# Patient Record
Sex: MALE | Race: WHITE
[De-identification: names, ages, dates, MRNs, and addresses within clinical notes are randomized per-mention and may not be internally consistent; named-entity substitution may affect disease eponyms.]

---

## 2019-07-19 ENCOUNTER — HOSPITAL ENCOUNTER (OUTPATIENT)
Dept: HOSPITAL 92 - BICMRI | Age: 34
Discharge: HOME | End: 2019-07-19
Attending: PHYSICIAN ASSISTANT
Payer: OTHER GOVERNMENT

## 2019-07-19 DIAGNOSIS — M25.462: Primary | ICD-10-CM

## 2019-07-19 DIAGNOSIS — M23.92: ICD-10-CM

## 2019-07-19 DIAGNOSIS — M25.562: ICD-10-CM

## 2019-07-19 NOTE — MRI
EXAM:

MRI left knee



PROVIDED CLINICAL HISTORY:

Pain status post injury



COMPARISON:

None



FINDINGS:

The anterior cruciate ligament, posterior cruciate ligament, medial collateral ligament and lateral c
ollateral ligamentous complex demonstrate an intact MR appearance, as does the extensor mechanism.



The medial and lateral menisci demonstrate no evidence for tear.



There is marrow edema involving the lateral aspect of the central weightbearing portions of the later
al femoral condyle with a 3 mm full-thickness articular cartilage defect involving the central

weightbearing portion of the lateral femoral condyle laterally. Medial femorotibial cartilage appears
 preserved.



There is marrow edema involving the inferior-medial patella with irregularity of the overlying articu
lar cartilage including areas of full-thickness fissuring. The medial patellar retinaculum appears

intact. There is edema at the expected attachment of the medial patellofemoral ligament with intact m
edial patellofemoral ligament fibers not identified. Patella crista is noted.



There is a moderate-large knee joint effusion. Regional marrow signal appears otherwise normal. Extra
vasation of joint fluid in the popliteal recess produces signal alteration within the adjacent

musculature.



IMPRESSION:



1. Findings compatible with recent lateral patellar dislocation/relocation, with partial-thickness ar
ticular cartilage injury involving the inferior aspects of the medial patellar facet. Intact fibers

of the MPFL are not identified.

2. Small full-thickness articular cartilage defect involving lateral femoral condyle.

3. Moderate-large knee joint effusion.



Reported By: Jose Martinez 

Electronically Signed:  7/19/2019 9:18 AM

## 2020-09-20 ENCOUNTER — HOSPITAL ENCOUNTER (EMERGENCY)
Dept: HOSPITAL 92 - ERS | Age: 35
Discharge: HOME | End: 2020-09-20
Payer: OTHER GOVERNMENT

## 2020-09-20 DIAGNOSIS — R06.00: Primary | ICD-10-CM

## 2020-09-20 LAB
ALBUMIN SERPL BCG-MCNC: 4.4 G/DL (ref 3.5–5)
ALP SERPL-CCNC: 69 U/L (ref 40–110)
ALT SERPL W P-5'-P-CCNC: 32 U/L (ref 8–55)
ANION GAP SERPL CALC-SCNC: 18 MMOL/L (ref 10–20)
AST SERPL-CCNC: 31 U/L (ref 5–34)
BASOPHILS # BLD AUTO: 0.1 THOU/UL (ref 0–0.2)
BASOPHILS NFR BLD AUTO: 1.2 % (ref 0–1)
BILIRUB SERPL-MCNC: 0.3 MG/DL (ref 0.2–1.2)
BUN SERPL-MCNC: 22 MG/DL (ref 8.9–20.6)
CALCIUM SERPL-MCNC: 9.1 MG/DL (ref 7.8–10.44)
CHLORIDE SERPL-SCNC: 102 MMOL/L (ref 98–107)
CO2 SERPL-SCNC: 22 MMOL/L (ref 22–29)
CREAT CL PREDICTED SERPL C-G-VRATE: 0 ML/MIN (ref 70–130)
EOSINOPHIL # BLD AUTO: 0.1 THOU/UL (ref 0–0.7)
EOSINOPHIL NFR BLD AUTO: 1.8 % (ref 0–10)
GLOBULIN SER CALC-MCNC: 3.6 G/DL (ref 2.4–3.5)
GLUCOSE SERPL-MCNC: 116 MG/DL (ref 70–105)
HGB BLD-MCNC: 15.6 G/DL (ref 14–18)
LIPASE SERPL-CCNC: 50 U/L (ref 8–78)
LYMPHOCYTES # BLD: 3.3 THOU/UL (ref 1.2–3.4)
LYMPHOCYTES NFR BLD AUTO: 46.1 % (ref 21–51)
MCH RBC QN AUTO: 32.6 PG (ref 27–31)
MCV RBC AUTO: 93.5 FL (ref 78–98)
MONOCYTES # BLD AUTO: 0.5 THOU/UL (ref 0.11–0.59)
MONOCYTES NFR BLD AUTO: 6.7 % (ref 0–10)
NEUTROPHILS # BLD AUTO: 3.2 THOU/UL (ref 1.4–6.5)
NEUTROPHILS NFR BLD AUTO: 44.3 % (ref 42–75)
PLATELET # BLD AUTO: 215 THOU/UL (ref 130–400)
POTASSIUM SERPL-SCNC: 3.7 MMOL/L (ref 3.5–5.1)
PROT UR STRIP.AUTO-MCNC: 20 MG/DL
RBC # BLD AUTO: 4.78 MILL/UL (ref 4.7–6.1)
SODIUM SERPL-SCNC: 138 MMOL/L (ref 136–145)
SP GR UR STRIP: 1.03 (ref 1–1.04)
WBC # BLD AUTO: 7.1 THOU/UL (ref 4.8–10.8)

## 2020-09-20 PROCEDURE — 36415 COLL VENOUS BLD VENIPUNCTURE: CPT

## 2020-09-20 PROCEDURE — 85025 COMPLETE CBC W/AUTO DIFF WBC: CPT

## 2020-09-20 PROCEDURE — 93005 ELECTROCARDIOGRAM TRACING: CPT

## 2020-09-20 PROCEDURE — 70360 X-RAY EXAM OF NECK: CPT

## 2020-09-20 PROCEDURE — 80053 COMPREHEN METABOLIC PANEL: CPT

## 2020-09-20 PROCEDURE — 71045 X-RAY EXAM CHEST 1 VIEW: CPT

## 2020-09-20 PROCEDURE — 81003 URINALYSIS AUTO W/O SCOPE: CPT

## 2020-09-20 PROCEDURE — 83690 ASSAY OF LIPASE: CPT

## 2020-09-20 NOTE — RAD
EXAM: 

CHEST ONE VIEW



HISTORY:

Shortness of breath and chest pain as well as epigastric abdominal pain.



COMPARISON:

None



FINDINGS:

The cardiac silhouette and pulmonary vasculature are within normal limits. The lungs are clear. The o
sseous structures are intact.



IMPRESSION:

No acute cardiopulmonary process.



Reported By: Canelo Nayak 

Electronically Signed:  9/20/2020 8:42 AM

## 2020-09-20 NOTE — RAD
EXAM:

XR Neck Soft Tissue



PROVIDED CLINICAL HISTORY:

Shortness of breath beginning 20 minutes prior to this examination. Pain in the epigastric region.



COMPARISON:

None



FINDINGS:

Prevertebral soft tissues are within normal limits. The epiglottis and aryepiglottic folds have a nor
mal radiographic appearance. Mild degenerative changes are seen in the cervical spine at the C5-6

level. Osseous structures otherwise have a normal appearance.



IMPRESSION:

No acute findings.



Reported By: Canelo Nayak 

Electronically Signed:  9/20/2020 8:44 AM

## 2020-10-16 ENCOUNTER — HOSPITAL ENCOUNTER (EMERGENCY)
Dept: HOSPITAL 92 - ERS | Age: 35
Discharge: HOME | End: 2020-10-16
Payer: OTHER GOVERNMENT

## 2020-10-16 DIAGNOSIS — K21.9: Primary | ICD-10-CM

## 2020-10-16 LAB
ALBUMIN SERPL BCG-MCNC: 4.6 G/DL (ref 3.5–5)
ALP SERPL-CCNC: 49 U/L (ref 40–110)
ALT SERPL W P-5'-P-CCNC: 16 U/L (ref 8–55)
ANION GAP SERPL CALC-SCNC: 13 MMOL/L (ref 10–20)
AST SERPL-CCNC: 19 U/L (ref 5–34)
BASOPHILS # BLD AUTO: 0 THOU/UL (ref 0–0.2)
BASOPHILS NFR BLD AUTO: 0.3 % (ref 0–1)
BILIRUB SERPL-MCNC: 0.5 MG/DL (ref 0.2–1.2)
BUN SERPL-MCNC: 18 MG/DL (ref 8.9–20.6)
CALCIUM SERPL-MCNC: 9.6 MG/DL (ref 7.8–10.44)
CHLORIDE SERPL-SCNC: 105 MMOL/L (ref 98–107)
CO2 SERPL-SCNC: 26 MMOL/L (ref 22–29)
CREAT CL PREDICTED SERPL C-G-VRATE: 0 ML/MIN (ref 70–130)
EOSINOPHIL # BLD AUTO: 0 THOU/UL (ref 0–0.7)
EOSINOPHIL NFR BLD AUTO: 0.1 % (ref 0–10)
GLOBULIN SER CALC-MCNC: 2.9 G/DL (ref 2.4–3.5)
GLUCOSE SERPL-MCNC: 143 MG/DL (ref 70–105)
HGB BLD-MCNC: 15.8 G/DL (ref 14–18)
LYMPHOCYTES # BLD: 0.5 THOU/UL (ref 1.2–3.4)
LYMPHOCYTES NFR BLD AUTO: 4.6 % (ref 21–51)
MCH RBC QN AUTO: 33.7 PG (ref 27–31)
MCV RBC AUTO: 93.4 FL (ref 78–98)
MONOCYTES # BLD AUTO: 0.4 THOU/UL (ref 0.11–0.59)
MONOCYTES NFR BLD AUTO: 4.5 % (ref 0–10)
MUCOUS THREADS UR QL AUTO: (no result) LPF
NEUTROPHILS # BLD AUTO: 8.9 THOU/UL (ref 1.4–6.5)
NEUTROPHILS NFR BLD AUTO: 90.5 % (ref 42–75)
PLATELET # BLD AUTO: 223 THOU/UL (ref 130–400)
POTASSIUM SERPL-SCNC: 3.6 MMOL/L (ref 3.5–5.1)
PROT UR STRIP.AUTO-MCNC: 50 MG/DL
RBC # BLD AUTO: 4.68 MILL/UL (ref 4.7–6.1)
SODIUM SERPL-SCNC: 140 MMOL/L (ref 136–145)
SP GR UR STRIP: 1.02 (ref 1–1.04)
WBC # BLD AUTO: 9.9 THOU/UL (ref 4.8–10.8)
WBC UR QL AUTO: (no result) HPF (ref 0–3)

## 2020-10-16 PROCEDURE — 83690 ASSAY OF LIPASE: CPT

## 2020-10-16 PROCEDURE — 85025 COMPLETE CBC W/AUTO DIFF WBC: CPT

## 2020-10-16 PROCEDURE — 81015 MICROSCOPIC EXAM OF URINE: CPT

## 2020-10-16 PROCEDURE — 81003 URINALYSIS AUTO W/O SCOPE: CPT

## 2020-10-16 PROCEDURE — 99284 EMERGENCY DEPT VISIT MOD MDM: CPT

## 2020-10-16 PROCEDURE — 36415 COLL VENOUS BLD VENIPUNCTURE: CPT

## 2020-10-16 PROCEDURE — 80053 COMPREHEN METABOLIC PANEL: CPT

## 2020-10-16 PROCEDURE — 84484 ASSAY OF TROPONIN QUANT: CPT

## 2021-02-24 ENCOUNTER — HOSPITAL ENCOUNTER (OUTPATIENT)
Dept: HOSPITAL 92 - NM | Age: 36
Discharge: HOME | End: 2021-02-24
Attending: PHYSICIAN ASSISTANT
Payer: OTHER GOVERNMENT

## 2021-02-24 DIAGNOSIS — R10.11: ICD-10-CM

## 2021-02-24 DIAGNOSIS — R11.2: ICD-10-CM

## 2021-02-24 DIAGNOSIS — K21.9: Primary | ICD-10-CM

## 2021-02-24 PROCEDURE — A9537 TC99M MEBROFENIN: HCPCS

## 2021-02-24 PROCEDURE — 78227 HEPATOBIL SYST IMAGE W/DRUG: CPT

## 2021-02-24 NOTE — NM
HEPATOBILIARY SCAN:



HISTORY:Gastroesophageal reflux disease without esophagitis. Please likely, nausea and vomiting. Post
prandial abdominal pain. No gallstones on ultrasound of 3/20/2021



RADIOPHARMACEUTICAL: 5.3 mCi Technetium 99m Mebrofenin injected intravenously



FINDINGS:

There is normal tracer extraction by the liver with normal excretion into the biliary tracts and smal
l bowel loops and normal filling of the gallbladder.



The calculated gallbladder ejection fraction following an oral fatty meal measures 47%.



IMPRESSION:Normal exam.



Reported By: Chandler Laurent 

Electronically Signed:  2/24/2021 10:43 AM

## 2021-05-31 ENCOUNTER — HOSPITAL ENCOUNTER (EMERGENCY)
Dept: HOSPITAL 92 - ERS | Age: 36
Discharge: HOME | End: 2021-05-31
Payer: OTHER GOVERNMENT

## 2021-05-31 DIAGNOSIS — Z79.899: ICD-10-CM

## 2021-05-31 DIAGNOSIS — K21.9: Primary | ICD-10-CM

## 2021-05-31 PROCEDURE — 93005 ELECTROCARDIOGRAM TRACING: CPT

## 2021-05-31 PROCEDURE — 71045 X-RAY EXAM CHEST 1 VIEW: CPT
